# Patient Record
Sex: MALE | Race: ASIAN | NOT HISPANIC OR LATINO | URBAN - METROPOLITAN AREA
[De-identification: names, ages, dates, MRNs, and addresses within clinical notes are randomized per-mention and may not be internally consistent; named-entity substitution may affect disease eponyms.]

---

## 2018-12-05 ENCOUNTER — EMERGENCY (EMERGENCY)
Facility: HOSPITAL | Age: 28
LOS: 0 days | Discharge: HOME | End: 2018-12-05
Attending: EMERGENCY MEDICINE
Payer: COMMERCIAL

## 2018-12-05 VITALS
SYSTOLIC BLOOD PRESSURE: 130 MMHG | TEMPERATURE: 100 F | DIASTOLIC BLOOD PRESSURE: 82 MMHG | HEART RATE: 100 BPM | OXYGEN SATURATION: 100 % | RESPIRATION RATE: 18 BRPM

## 2018-12-05 VITALS
TEMPERATURE: 98 F | HEIGHT: 73 IN | DIASTOLIC BLOOD PRESSURE: 79 MMHG | WEIGHT: 182.98 LBS | RESPIRATION RATE: 20 BRPM | SYSTOLIC BLOOD PRESSURE: 144 MMHG | HEART RATE: 111 BPM | OXYGEN SATURATION: 97 %

## 2018-12-05 DIAGNOSIS — R21 RASH AND OTHER NONSPECIFIC SKIN ERUPTION: ICD-10-CM
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
## 2018-12-05 DIAGNOSIS — L29.9 PRURITUS, UNSPECIFIED: ICD-10-CM
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
## 2018-12-05 DIAGNOSIS — T78.40XA ALLERGY, UNSPECIFIED, INITIAL ENCOUNTER: ICD-10-CM

## 2018-12-05 PROCEDURE — 99283 EMERGENCY DEPT VISIT LOW MDM: CPT | Mod: 25

## 2018-12-05 RX ORDER — DIPHENHYDRAMINE HCL 50 MG
50 CAPSULE ORAL ONCE
Qty: 0 | Refills: 0 | Status: COMPLETED | OUTPATIENT
Start: 2018-12-05 | End: 2018-12-05

## 2018-12-05 RX ORDER — FAMOTIDINE 10 MG/ML
20 INJECTION INTRAVENOUS ONCE
Qty: 0 | Refills: 0 | Status: COMPLETED | OUTPATIENT
Start: 2018-12-05 | End: 2018-12-05

## 2018-12-05 RX ORDER — DIPHENHYDRAMINE HCL 50 MG
2 CAPSULE ORAL
Qty: 18 | Refills: 0 | OUTPATIENT
Start: 2018-12-05 | End: 2018-12-07

## 2018-12-05 RX ADMIN — Medication 50 MILLIGRAM(S): at 07:59

## 2018-12-05 RX ADMIN — FAMOTIDINE 20 MILLIGRAM(S): 10 INJECTION INTRAVENOUS at 07:58

## 2018-12-05 RX ADMIN — Medication 20 MILLIGRAM(S): at 07:58

## 2018-12-05 NOTE — ED PROVIDER NOTE - OBJECTIVE STATEMENT
Pt is a 27 yo gentleman with no significant past medical history who presents to the ED with allergic reaction. Last night noticed rash in his upper back and neck and face. Itchy. No tongue or mouth swelling, no gi symptoms. No new medications, no new foods. Has had this before in the past, never found out the cause. Does not have any known allergies. Did not take any medications.

## 2018-12-05 NOTE — ED PROVIDER NOTE - MEDICAL DECISION MAKING DETAILS
Ddx: Allergic reaction, mild/ no evidence of anaphylaxis  Plan: Benadryl, pepcid, prednisone, reassess

## 2018-12-05 NOTE — ED PROVIDER NOTE - NSFOLLOWUPCLINICS_GEN_ALL_ED_FT
[facility]  Allergy  [address] [place] [address]  Phone: [phone]  Fax:   Follow Up Time:

## 2018-12-05 NOTE — ED ADULT NURSE NOTE - OBJECTIVE STATEMENT
pt c/o of allergic reaction with itchy hives on his body (back, face, head, neck, legs), swollen tongue. pt states he difficulty breathing through his nose. pt states this happens every year around this time. denies following up with allergist and pt is unknown what he is allergic to. pt denies any new foods or exposure

## 2025-02-10 NOTE — ED ADULT NURSE NOTE - NSIMPLEMENTINTERV_GEN_ALL_ED
Interventional Radiology
Implemented All Universal Safety Interventions:  [place] to call system. Call bell, personal items and telephone within reach. Instruct patient to call for assistance. Room bathroom lighting operational. Non-slip footwear when patient is off stretcher. Physically safe environment: no spills, clutter or unnecessary equipment. Stretcher in lowest position, wheels locked, appropriate side rails in place.